# Patient Record
Sex: FEMALE | Race: WHITE | NOT HISPANIC OR LATINO | URBAN - METROPOLITAN AREA
[De-identification: names, ages, dates, MRNs, and addresses within clinical notes are randomized per-mention and may not be internally consistent; named-entity substitution may affect disease eponyms.]

---

## 2017-08-24 ENCOUNTER — OFFICE VISIT (OUTPATIENT)
Dept: PSYCHIATRY | Facility: CLINIC | Age: 22
End: 2017-08-24
Payer: COMMERCIAL

## 2017-08-24 DIAGNOSIS — F40.10 SOCIAL ANXIETY DISORDER: ICD-10-CM

## 2017-08-24 DIAGNOSIS — F41.1 GAD (GENERALIZED ANXIETY DISORDER): ICD-10-CM

## 2017-08-24 DIAGNOSIS — F90.9 ADULT ADHD: ICD-10-CM

## 2017-08-24 PROCEDURE — 90791 PSYCH DIAGNOSTIC EVALUATION: CPT | Mod: S$GLB,,, | Performed by: SOCIAL WORKER

## 2017-09-05 PROBLEM — F41.1 GAD (GENERALIZED ANXIETY DISORDER): Status: ACTIVE | Noted: 2017-09-05

## 2017-09-05 PROBLEM — F40.10 SOCIAL ANXIETY DISORDER: Status: ACTIVE | Noted: 2017-09-05

## 2017-09-05 PROBLEM — F90.9 ADULT ADHD: Status: ACTIVE | Noted: 2017-09-05

## 2017-09-05 NOTE — PROGRESS NOTES
Psychiatry Initial Visit (PhD/LCSW)  Diagnostic Interview - CPT 37346    Date: 8/24/2017    Site: [facility]    Referral source: Self    Clinical status of patient: Outpatient    Suzan Karimi, a 22 y.o. female, for initial evaluation visit.  Met with patient.    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 Pt reports she has been struggling more with keeping up with the pace with her assignments and clinical rotations. Pt reports her grades are going down and last semester grades were not that good in two of her classes and reports she almost failed the semester. Pt reports over the last year it seems to get more difficult to get through her readings and the ppt presentations. Pt reports because she is not doing as well she has become more preoccupied and worried about not doing as well in school. Pt reports she was not formally diagnosed with ADHD in childhood or in highschool however she notes that she did have to work harder than most in her core classes in highschool to pass them.  Pt reports lately she feels like she has lost a lot of her confidence and is also more anxious socially or when she is with other nursing students at school or even when she is going to the grocery store pt reports she feels like everyone is looking at her. Pt states she has struggled with social anxiety however since she has been in nursing school it seems to have become more progressive. Pt reports she grew up in [place] her parents are  and she moved in with her father who lives in [place] so she could attend [employer] Nursing program. Pt has had to adjust to a lot of change in order to participate in this program. Pt also admits she does worry about finances  because of the rigors of her program she is not able to work and attend school.   Pt was interview with an ADHD symptom check list for the past 6 month during eval: Pt listed very often for the following symptoms:  Difficulty sustaining attention to tasks, easily distratcted to stimuli, fidgets/restlessness moving of hands and or feet, blurt out answers before questions have been completed.  Often: not able to listen when spoken directly to, will avoid and dislike tasks that require sustained mental effort, forgetful in daily activities, difficulty with engaging in leisurely activities quietly, talk excessively,  Often interrupt or intrude on other's conversations.      Pt states when she thinks about it and looks back at high school she did do a lot of these things back then. However pt reports her symptoms impair her level of functioning more in both social, home and most importantly school.   See Adult ADHD Self-Report Form (ASRS-v.1)  And the Adult Symptoms Observer Version-pt mailed into clinician.    Pain: noncontributory     Symptoms:   · Mood: poor concentration  · Anxiety: excessive anxiety/worry and pt reports the following anxiety symptoms: palpitations/accelerated heart rate, sweating, trembling or shaking, and fear of losing control, also a social anxiety-fear of being embarassed and will avoid social situations that make her anxious if possible.   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 Pt reports to have a good support system with most of her friends living on the [facility]. Pt reports she has made a few friends through her nursing  "program however reports to not have a lot of time with social activities. Pt reports she is not very close to her friends or family. Pt is not able to work during this time due to the rigors of her nursing program. Pt reports she does worry about fiances but is managing this with her family.     Substance use:   Alcohol: none   Drugs: none   Tobacco: none   Caffeine: 1-cup of coffee in am    Current medications and drug reactions (include OTC, herbal): see medication list     Strengths and liabilities: Strength: Patient accepts guidance/feedback, Strength: Patient is expressive/articulate., Strength: Patient is intelligent., Strength: Patient is motivated for change., Strength: Patient is physically healthy., Strength: Patient has positive support network., Strength: Patient has reasonable judgment., Strength: Patient is stable.    Current Evaluation:     Mental Status Exam:  General Appearance:  age appropriate, casually dressed, neatly groomed, overweight   Speech: normal tone, normal rate, normal pitch, normal volume      Level of Cooperation: cooperative      Thought Processes: normal and logical   Mood: steady      Thought Content: normal, no suicidality, no homicidality, delusions, or paranoia   Affect: congruent and appropriate   Orientation: Oriented x3   Memory: recent >  intact, remote >  intact   Attention Span & Concentration: spelled "WORLD" forwards and backwards   Fund of General Knowledge: intact and appropriate to age and level of education   Abstract Reasoning: interpretation of similarities was abstract   Judgment & Insight: good     Language  intact     Diagnostic Impression - Plan:     Impression:  ADHD, inattentive   MIRNA  Social Anxiety      Plan:consult psychiatrist for medication evaluation    Return to Clinic: as needed    Length of Service (minutes): 60  "

## 2021-04-16 ENCOUNTER — PATIENT MESSAGE (OUTPATIENT)
Dept: RESEARCH | Facility: HOSPITAL | Age: 26
End: 2021-04-16